# Patient Record
Sex: MALE | Race: BLACK OR AFRICAN AMERICAN | NOT HISPANIC OR LATINO | Employment: FULL TIME | URBAN - METROPOLITAN AREA
[De-identification: names, ages, dates, MRNs, and addresses within clinical notes are randomized per-mention and may not be internally consistent; named-entity substitution may affect disease eponyms.]

---

## 2020-01-28 ENCOUNTER — HOSPITAL ENCOUNTER (EMERGENCY)
Facility: HOSPITAL | Age: 65
Discharge: HOME | End: 2020-01-28
Attending: EMERGENCY MEDICINE
Payer: COMMERCIAL

## 2020-01-28 VITALS
HEIGHT: 72 IN | SYSTOLIC BLOOD PRESSURE: 138 MMHG | TEMPERATURE: 99 F | RESPIRATION RATE: 17 BRPM | DIASTOLIC BLOOD PRESSURE: 80 MMHG | BODY MASS INDEX: 25.6 KG/M2 | WEIGHT: 189 LBS | OXYGEN SATURATION: 96 % | HEART RATE: 88 BPM

## 2020-01-28 DIAGNOSIS — N45.3 EPIDIDYMO-ORCHITIS, ACUTE: Primary | ICD-10-CM

## 2020-01-28 DIAGNOSIS — N50.811 PAIN IN RIGHT TESTICLE: ICD-10-CM

## 2020-01-28 LAB
ALBUMIN SERPL BCP-MCNC: 3.4 G/DL (ref 3.5–5.2)
ALP SERPL-CCNC: 82 U/L (ref 55–135)
ALT SERPL W/O P-5'-P-CCNC: 13 U/L (ref 10–44)
ANION GAP SERPL CALC-SCNC: 7 MMOL/L (ref 8–16)
AST SERPL-CCNC: 17 U/L (ref 10–40)
BACTERIA #/AREA URNS AUTO: ABNORMAL /HPF
BASOPHILS # BLD AUTO: 0.01 K/UL (ref 0–0.2)
BASOPHILS NFR BLD: 0.1 % (ref 0–1.9)
BILIRUB SERPL-MCNC: 0.6 MG/DL (ref 0.1–1)
BILIRUB UR QL STRIP: NEGATIVE
BUN SERPL-MCNC: 13 MG/DL (ref 8–23)
CALCIUM SERPL-MCNC: 9 MG/DL (ref 8.7–10.5)
CHLORIDE SERPL-SCNC: 104 MMOL/L (ref 95–110)
CLARITY UR REFRACT.AUTO: ABNORMAL
CO2 SERPL-SCNC: 24 MMOL/L (ref 23–29)
COLOR UR AUTO: YELLOW
CREAT SERPL-MCNC: 1.3 MG/DL (ref 0.5–1.4)
DIFFERENTIAL METHOD: ABNORMAL
EOSINOPHIL # BLD AUTO: 0 K/UL (ref 0–0.5)
EOSINOPHIL NFR BLD: 0.2 % (ref 0–8)
ERYTHROCYTE [DISTWIDTH] IN BLOOD BY AUTOMATED COUNT: 15.3 % (ref 11.5–14.5)
EST. GFR  (AFRICAN AMERICAN): >60 ML/MIN/1.73 M^2
EST. GFR  (NON AFRICAN AMERICAN): 58.1 ML/MIN/1.73 M^2
GLUCOSE SERPL-MCNC: 99 MG/DL (ref 70–110)
GLUCOSE UR QL STRIP: NEGATIVE
HCT VFR BLD AUTO: 44.2 % (ref 40–54)
HGB BLD-MCNC: 14.4 G/DL (ref 14–18)
HGB UR QL STRIP: ABNORMAL
IMM GRANULOCYTES # BLD AUTO: 0.03 K/UL (ref 0–0.04)
IMM GRANULOCYTES NFR BLD AUTO: 0.3 % (ref 0–0.5)
KETONES UR QL STRIP: NEGATIVE
LEUKOCYTE ESTERASE UR QL STRIP: ABNORMAL
LYMPHOCYTES # BLD AUTO: 0.8 K/UL (ref 1–4.8)
LYMPHOCYTES NFR BLD: 8.9 % (ref 18–48)
MCH RBC QN AUTO: 31.4 PG (ref 27–31)
MCHC RBC AUTO-ENTMCNC: 32.6 G/DL (ref 32–36)
MCV RBC AUTO: 97 FL (ref 82–98)
MICROSCOPIC COMMENT: ABNORMAL
MONOCYTES # BLD AUTO: 0.9 K/UL (ref 0.3–1)
MONOCYTES NFR BLD: 10.4 % (ref 4–15)
NEUTROPHILS # BLD AUTO: 7.2 K/UL (ref 1.8–7.7)
NEUTROPHILS NFR BLD: 80.1 % (ref 38–73)
NITRITE UR QL STRIP: POSITIVE
NRBC BLD-RTO: 0 /100 WBC
PH UR STRIP: 5 [PH] (ref 5–8)
PLATELET # BLD AUTO: 159 K/UL (ref 150–350)
PMV BLD AUTO: 10 FL (ref 9.2–12.9)
POTASSIUM SERPL-SCNC: 3.8 MMOL/L (ref 3.5–5.1)
PROT SERPL-MCNC: 7.3 G/DL (ref 6–8.4)
PROT UR QL STRIP: NEGATIVE
RBC # BLD AUTO: 4.58 M/UL (ref 4.6–6.2)
RBC #/AREA URNS AUTO: 8 /HPF (ref 0–4)
SODIUM SERPL-SCNC: 135 MMOL/L (ref 136–145)
SP GR UR STRIP: 1.02 (ref 1–1.03)
SQUAMOUS #/AREA URNS AUTO: 0 /HPF
URN SPEC COLLECT METH UR: ABNORMAL
WBC # BLD AUTO: 8.95 K/UL (ref 3.9–12.7)
WBC #/AREA URNS AUTO: >100 /HPF (ref 0–5)

## 2020-01-28 PROCEDURE — 81001 URINALYSIS AUTO W/SCOPE: CPT

## 2020-01-28 PROCEDURE — 87077 CULTURE AEROBIC IDENTIFY: CPT

## 2020-01-28 PROCEDURE — 99285 EMERGENCY DEPT VISIT HI MDM: CPT | Mod: ,,, | Performed by: PHYSICIAN ASSISTANT

## 2020-01-28 PROCEDURE — 99284 EMERGENCY DEPT VISIT MOD MDM: CPT | Mod: 25

## 2020-01-28 PROCEDURE — 85025 COMPLETE CBC W/AUTO DIFF WBC: CPT

## 2020-01-28 PROCEDURE — 99285 PR EMERGENCY DEPT VISIT,LEVEL V: ICD-10-PCS | Mod: ,,, | Performed by: PHYSICIAN ASSISTANT

## 2020-01-28 PROCEDURE — 87186 SC STD MICRODIL/AGAR DIL: CPT

## 2020-01-28 PROCEDURE — 80053 COMPREHEN METABOLIC PANEL: CPT

## 2020-01-28 PROCEDURE — 87088 URINE BACTERIA CULTURE: CPT

## 2020-01-28 PROCEDURE — 96361 HYDRATE IV INFUSION ADD-ON: CPT

## 2020-01-28 PROCEDURE — 63600175 PHARM REV CODE 636 W HCPCS: Performed by: PHYSICIAN ASSISTANT

## 2020-01-28 PROCEDURE — 25000003 PHARM REV CODE 250: Performed by: PHYSICIAN ASSISTANT

## 2020-01-28 PROCEDURE — 96374 THER/PROPH/DIAG INJ IV PUSH: CPT

## 2020-01-28 PROCEDURE — 87086 URINE CULTURE/COLONY COUNT: CPT

## 2020-01-28 RX ORDER — LEVOFLOXACIN 500 MG/1
500 TABLET, FILM COATED ORAL DAILY
Qty: 9 TABLET | Refills: 0 | Status: SHIPPED | OUTPATIENT
Start: 2020-01-28 | End: 2020-02-07

## 2020-01-28 RX ORDER — LEVOFLOXACIN 500 MG/1
500 TABLET, FILM COATED ORAL
Status: COMPLETED | OUTPATIENT
Start: 2020-01-28 | End: 2020-01-28

## 2020-01-28 RX ORDER — CEFTRIAXONE 1 G/1
1 INJECTION, POWDER, FOR SOLUTION INTRAMUSCULAR; INTRAVENOUS
Status: COMPLETED | OUTPATIENT
Start: 2020-01-28 | End: 2020-01-28

## 2020-01-28 RX ORDER — ACETAMINOPHEN 500 MG
500 TABLET ORAL
Status: DISCONTINUED | OUTPATIENT
Start: 2020-01-28 | End: 2020-01-28 | Stop reason: HOSPADM

## 2020-01-28 RX ORDER — TAMSULOSIN HYDROCHLORIDE 0.4 MG/1
0.4 CAPSULE ORAL DAILY
COMMUNITY

## 2020-01-28 RX ORDER — MORPHINE SULFATE 15 MG/1
15 TABLET ORAL
Status: COMPLETED | OUTPATIENT
Start: 2020-01-28 | End: 2020-01-28

## 2020-01-28 RX ORDER — LISINOPRIL 5 MG/1
5 TABLET ORAL DAILY
COMMUNITY

## 2020-01-28 RX ADMIN — MORPHINE SULFATE 15 MG: 15 TABLET ORAL at 04:01

## 2020-01-28 RX ADMIN — SODIUM CHLORIDE 1000 ML: 0.9 INJECTION, SOLUTION INTRAVENOUS at 04:01

## 2020-01-28 RX ADMIN — CEFTRIAXONE SODIUM 1 G: 1 INJECTION, POWDER, FOR SOLUTION INTRAMUSCULAR; INTRAVENOUS at 06:01

## 2020-01-28 RX ADMIN — LEVOFLOXACIN 500 MG: 500 TABLET, FILM COATED ORAL at 08:01

## 2020-01-28 NOTE — ED NOTES
Appearance:  Pt awake, alert & oriented to person, place & time.    Skin:  Skin warm, dry & intact.  Mucous membranes moist.  Skin turgor normal.  Respiratory:  Respirations even, non-labored.    Neurologic:  Pt moving all extremities without difficulty.  Sensation intact.     Peripheral Vascular:  All peripheral pulses present.  Abdomen:  Abdomen soft, non-tender to palpation.

## 2020-01-28 NOTE — ED PROVIDER NOTES
Encounter Date: 2020       History     Chief Complaint   Patient presents with    Male  Problem     pulled r groin/ testicle on sat with swelling, today chills     64 year old male with medical history of HTN, HLD, BPH, Tobacco use presenting to the ED with the chief complaint of right testicle pain. Patient reports having 3 days of right testicular pain and swelling. He reports worsening pain while climbing into his SUV and believes he may have pulled a muscle. He reports having subjective fever and chills today. He denies dysuria, hematuria, penile discharge, urinary frequency, perineum pain, rectal pain, abdominal pain. He denies concerns for STD. He denies history of hernias and abdominal surgical history. He had a normal bowel movement today. He tried to see his PCP today, but he was unavailable which prompted his ED visit today. He took Tylenol #3 today at 11am and 3:30pm for his pain. He reports history of prostate infection and urinary retention after a prostate biopsy a few months ago.         Review of patient's allergies indicates:  No Known Allergies  Past Medical History:   Diagnosis Date    Enlarged prostate     Hyperlipidemia     Hypertension     Prehypertension     Smoker      History reviewed. No pertinent surgical history.  History reviewed. No pertinent family history.  Social History     Tobacco Use    Smoking status: Current Every Day Smoker     Packs/day: 0.50     Years: 45.00     Pack years: 22.50     Types: Cigarettes     Last attempt to quit: 3/27/2015     Years since quittin.8    Smokeless tobacco: Never Used   Substance Use Topics    Alcohol use: Yes     Comment: social    Drug use: No     Review of Systems   Constitutional: Positive for chills and fever (subjective).   HENT: Negative for congestion, sore throat and trouble swallowing.    Eyes: Negative for pain, redness and visual disturbance.   Respiratory: Negative for cough and shortness of breath.     Cardiovascular: Negative for chest pain.   Gastrointestinal: Negative for abdominal pain, diarrhea, nausea, rectal pain and vomiting.   Genitourinary: Positive for scrotal swelling and testicular pain. Negative for dysuria and hematuria.   Musculoskeletal: Negative for arthralgias, back pain, neck pain and neck stiffness.   Skin: Negative for rash and wound.   Neurological: Negative for weakness, light-headedness and headaches.       Physical Exam     Initial Vitals [01/28/20 1548]   BP Pulse Resp Temp SpO2   (!) 158/98 91 18 (!) 100.5 °F (38.1 °C) 96 %      MAP       --         Physical Exam    Constitutional: He appears well-developed and well-nourished. He is not diaphoretic. No distress.   HENT:   Head: Normocephalic and atraumatic.   Mouth/Throat: Oropharynx is clear and moist. No oropharyngeal exudate.   Eyes: EOM are normal. Pupils are equal, round, and reactive to light.   Neck: Normal range of motion. Neck supple.   Cardiovascular: Normal rate and regular rhythm.   Pulmonary/Chest: Breath sounds normal. No respiratory distress. He has no wheezes.   Abdominal: There is tenderness (Suprapubic).   No CVA tenderness   Genitourinary: Uncircumcised. No discharge found.   Genitourinary Comments: R testicular edema and tenderness   Musculoskeletal: Normal range of motion. He exhibits no edema or tenderness.   Neurological: He is alert and oriented to person, place, and time. He has normal strength. No cranial nerve deficit or sensory deficit.   Skin: Skin is warm and dry. No rash noted. No erythema.       ED Course   Procedures  Labs Reviewed   CULTURE, URINE - Abnormal; Notable for the following components:       Result Value    Urine Culture, Routine   (*)     Value: PRESUMPTIVE E COLI  >100,000 cfu/ml  Identification and susceptibility pending      All other components within normal limits    Narrative:     Preferred Collection Type->Urine, Clean Catch   CBC W/ AUTO DIFFERENTIAL - Abnormal; Notable for the  following components:    RBC 4.58 (*)     Mean Corpuscular Hemoglobin 31.4 (*)     RDW 15.3 (*)     Lymph # 0.8 (*)     Gran% 80.1 (*)     Lymph% 8.9 (*)     All other components within normal limits   COMPREHENSIVE METABOLIC PANEL - Abnormal; Notable for the following components:    Sodium 135 (*)     Albumin 3.4 (*)     Anion Gap 7 (*)     eGFR if non  58.1 (*)     All other components within normal limits   URINALYSIS, REFLEX TO URINE CULTURE - Abnormal; Notable for the following components:    Appearance, UA Hazy (*)     Occult Blood UA 1+ (*)     Nitrite, UA Positive (*)     Leukocytes, UA 3+ (*)     All other components within normal limits    Narrative:     Preferred Collection Type->Urine, Clean Catch   URINALYSIS MICROSCOPIC - Abnormal; Notable for the following components:    RBC, UA 8 (*)     WBC, UA >100 (*)     Bacteria Moderate (*)     All other components within normal limits    Narrative:     Preferred Collection Type->Urine, Clean Catch          Imaging Results           US Scrotum And Testicles (Final result)  Result time 01/28/20 19:06:56    Final result by Albert Peres MD (01/28/20 19:06:56)                 Impression:      Findings compatible with right epididymo-orchitis.  There are 2 hypoechoic structures within the epididymal tail, the largest measuring up to 0.6 and 0.3 cm, concerning for abscesses.    Small to moderate volume right hydrocele, likely reactive.    Additional incidental findings, as above.    This report was flagged in Epic as abnormal.    Electronically signed by resident: Emma Morgan  Date:    01/28/2020  Time:    18:51    Electronically signed by: Albert Peres MD  Date:    01/28/2020  Time:    19:06             Narrative:    EXAMINATION:  US SCROTUM AND TESTICLES    CLINICAL HISTORY:  Right testicular pain    TECHNIQUE:  Sonography of the scrotum and testes.    COMPARISON:  None.    FINDINGS:  Right Testicle:    *Size: 3.6 x 2.8 x 2.9  cm  *Appearance: Unremarkable.  *Flow: Increased flow within the right testicle compared to the left.  *Epididymis: Thickened with increased vascular flow, consistent with epididymitis.  There are 2 adjacent hypoechoic foci within the epididymal tail, measuring 0.6 x 0.5 x 0.6 cm and 0.3 x 0.2 x 0.3 cm, without evidence of internal vascular flow, concerning for abscess formation.  Two adjacent epididymal head cysts measuring up to 0.3 cm.  *Hydrocele: Small to moderate volume.  *Varicocele: None.  .    Left Testicle:    *Size: 3.9 x 2.1 x 2.3 cm  *Appearance: Unremarkable.  *Flow: Normal arterial and venous flow  *Epididymis: Tiny epididymal head cyst measuring up to 0.4 cm.  Otherwise, unremarkable.  *Hydrocele: None.  *Varicocele: None.  .    Other findings: None.                                 Medical Decision Making:   History:   Old Medical Records: I decided to obtain old medical records.  Old Records Summarized: records from clinic visits.  Clinical Tests:   Lab Tests: Ordered and Reviewed  Radiological Study: Ordered and Reviewed  Other:   I have discussed this case with another health care provider.       <> Summary of the Discussion: Discussed case with Urology       APC / Resident Notes:   64 year old male with medical history of HTN, HLD, BPH, Tobacco use presenting to the ED c/o 3 days of right testicular pain and swelling. Triage vitals significant for T100.5. DDx includes but not limited to epididymitis, orchiditis, testicular torsion, prostatitis, UTI, hernia, urethritis. Will obtain labs and U/S scrotum. Reports taking a Tylenol #3 prior to arrival.      WBC 8.9, H/H 14/44, Plt 159, Crt 1.3  UA shows +nitrite, 3 leuk, >100 WBC  U/S shows right epididymo-orchitis with 2 hypoechoic structures measuring up to 0.6 and 0.3 cm, concerning for abscesses.    Etiology consistent with epididymo-orchitis. Discussed U/S findings and concerns for abscesses with Urology. No induration or overlying skin changes  to the scrotum and do not feel I&D would be beneficial. Recommending outpatient follow-up with ABx therapy. Rocephin IV x1 given in the ED and RX for Levaquin provided. He has an appointment with his Urologist at [facility] tomorrow for further evaluation. Patient expresses understanding and agreeable to the plan. Return to ED precautions given for new, worsening, or concerning symptoms. I have discussed the care of this patient with my supervising physician.         Attending Attestation:     Physician Attestation Statement for NP/PA:   I discussed this assessment and plan of this patient with the NP/PA, but I did not personally examine the patient. The face to face encounter was performed by the NP/PA.                                Clinical Impression:       ICD-10-CM ICD-9-CM   1. Epididymo-orchitis, acute N45.3 604.99   2. Pain in right testicle N50.811 608.9         Disposition:   Disposition: Discharged  Condition: Stable                     Albert Garcia PA-C  01/28/20 5017       Carlos Luis III, MD  01/30/20 1218

## 2020-01-28 NOTE — ED TRIAGE NOTES
Pt c/o right testicular swelling since Saturday.  States now having groin pain.  Pt states he began having chills earlier today-pt took Tylenol 1530.  Pt denies urinary symptoms.

## 2020-01-29 NOTE — DISCHARGE INSTRUCTIONS
Take the prescribed Levaquin antibiotic as directed for further management of your testicular pain  Follow-up with your Urologist for further evaluation    Our goal in the emergency department is to always give you outstanding care and exceptional service. You may receive a survey by mail or e-mail in the next week regarding your experience in our ED. We would greatly appreciate your completing and returning the survey. Your feedback provides us with a way to recognize our staff who give very good care and it helps us learn how to improve when your experience was below our aspiration of excellence.

## 2020-01-30 LAB — BACTERIA UR CULT: ABNORMAL

## 2020-05-31 ENCOUNTER — HOSPITAL ENCOUNTER (EMERGENCY)
Facility: HOSPITAL | Age: 65
Discharge: HOME | End: 2020-05-31
Attending: EMERGENCY MEDICINE
Payer: COMMERCIAL

## 2020-05-31 VITALS
HEART RATE: 84 BPM | WEIGHT: 192 LBS | HEIGHT: 72 IN | SYSTOLIC BLOOD PRESSURE: 132 MMHG | RESPIRATION RATE: 18 BRPM | DIASTOLIC BLOOD PRESSURE: 77 MMHG | BODY MASS INDEX: 26.01 KG/M2 | TEMPERATURE: 99 F | OXYGEN SATURATION: 99 %

## 2020-05-31 DIAGNOSIS — H11.31 SUBCONJUNCTIVAL HEMORRHAGE OF RIGHT EYE: Primary | ICD-10-CM

## 2020-05-31 PROCEDURE — 99284 EMERGENCY DEPT VISIT MOD MDM: CPT | Mod: ,,, | Performed by: EMERGENCY MEDICINE

## 2020-05-31 PROCEDURE — 99284 EMERGENCY DEPT VISIT MOD MDM: CPT | Mod: 25

## 2020-05-31 PROCEDURE — 25000003 PHARM REV CODE 250: Performed by: EMERGENCY MEDICINE

## 2020-05-31 PROCEDURE — 99284 PR EMERGENCY DEPT VISIT,LEVEL IV: ICD-10-PCS | Mod: ,,, | Performed by: EMERGENCY MEDICINE

## 2020-05-31 RX ORDER — PROPARACAINE HYDROCHLORIDE 5 MG/ML
1 SOLUTION/ DROPS OPHTHALMIC
Status: COMPLETED | OUTPATIENT
Start: 2020-05-31 | End: 2020-05-31

## 2020-05-31 RX ORDER — ERYTHROMYCIN 5 MG/G
OINTMENT OPHTHALMIC
Qty: 1 TUBE | Refills: 0 | Status: SHIPPED | OUTPATIENT
Start: 2020-05-31

## 2020-05-31 RX ADMIN — PROPARACAINE HYDROCHLORIDE 1 DROP: 5 SOLUTION/ DROPS OPHTHALMIC at 01:05

## 2020-05-31 RX ADMIN — FLUORESCEIN SODIUM 1 EACH: 1 STRIP OPHTHALMIC at 01:05

## 2020-05-31 NOTE — ED PROVIDER NOTES
CC: Eye Problem (cutting grass and took goggles off, something hit him in right eye )      History provided by:   Patient     HPI: Orlando Pham is a 64 y.o. year old male who presents to the ED complaining of right eye swelling after he thinks a piece of wood flew into his right eye.  He was blowing leaves, his goggles started to fog and removed them and afterwards he felt like something got into his right eye believes a small branch possibly flew in his right eye and since then he has been having swelling of the right eye, minimal pain, no decreased vision            Past Medical History:   Diagnosis Date    Enlarged prostate     Hyperlipidemia     Hypertension     Prehypertension     Smoker      History reviewed. No pertinent surgical history.  History reviewed. No pertinent family history.  No current facility-administered medications on file prior to encounter.      Current Outpatient Medications on File Prior to Encounter   Medication Sig Dispense Refill    lisinopril (PRINIVIL,ZESTRIL) 5 MG tablet Take 5 mg by mouth once daily.      tamsulosin (FLOMAX) 0.4 mg Cap Take 0.4 mg by mouth once daily.      albuterol 90 mcg/actuation inhaler Inhale 2 puffs into the lungs every 4 (four) hours as needed for Wheezing or Shortness of Breath. 1 each 11    nicotine polacrilex (NICORETTE) 4 MG Gum Take 1 each (4 mg total) by mouth as needed. 220 each 2    potassium chloride (KLOR-CON) 10 MEQ TbSR Take 2 tablets (20 mEq total) by mouth daily as needed (muscle cramps). 60 tablet 5    rosuvastatin (CRESTOR) 10 MG tablet Take 1 tablet (10 mg total) by mouth once daily. 30 tablet 11    varenicline (CHANTIX STARTING MONTH BOX) 0.5 mg (11)- 1 mg (42) tablet Take as directed on Starter Pack. 1 Package 0    varenicline (CHANTIX) 1 mg Tab Take 1 tablet (1 mg total) by mouth 2 (two) times daily. Take after finishing Starter Pack 56 tablet 2     Patient has no known allergies.  Social History     Socioeconomic  History    Marital status:      Spouse name: Not on file    Number of children: Not on file    Years of education: Not on file    Highest education level: Not on file   Occupational History    Not on file   Social Needs    Financial resource strain: Not on file    Food insecurity:     Worry: Not on file     Inability: Not on file    Transportation needs:     Medical: Not on file     Non-medical: Not on file   Tobacco Use    Smoking status: Current Every Day Smoker     Packs/day: 0.50     Years: 45.00     Pack years: 22.50     Types: Cigarettes     Last attempt to quit: 3/27/2015     Years since quittin.1    Smokeless tobacco: Never Used   Substance and Sexual Activity    Alcohol use: Yes     Comment: social    Drug use: No    Sexual activity: Yes     Partners: Female   Lifestyle    Physical activity:     Days per week: Not on file     Minutes per session: Not on file    Stress: Not on file   Relationships    Social connections:     Talks on phone: Not on file     Gets together: Not on file     Attends Congregational service: Not on file     Active member of club or organization: Not on file     Attends meetings of clubs or organizations: Not on file     Relationship status: Not on file   Other Topics Concern    Not on file   Social History Narrative    Work for [employer]       ROS:     Constitutional : neg for fever, neg for weakness  HENT neg for head injury, neg for sore throat  Eyes:right eye swelling, minimal pain  Resp neg for SOB, neg for cough  Cardiac  neg for chest pain, neg for palpitations  GI neg for abd pain, neg for nausea, neg for vomiting   neg for urinary changes  Neuro neg for focal weakness or numbness  Skin neg for skin rash  MSK: neg for myalgia, neg for arthralgia  ALL: Patient has no known allergies.    PHYSICAL EXAM:  Vitals:    20 1317   BP: 134/69   Pulse: 86   Resp: 18   Temp: 98.7 °F (37.1 °C)         PHYSICAL EXAM:     general: comfortable, in no acute  distress, pleasant, well nourished  VS: triage VS reviewed  HENT: NC/AT  Eyes: PERRL, EOMI, 20/40 right eye, 20/70 left eye  Ant chamber quiet  Neg ivette sign, no corneal abrasion  no clear conjunctival laceration  Neuro: AAO x 3, face symmetric, speech normal  MSK: no deformity, no edema                        DATA & INTERVENTIONS:    LABS reviewed:  Labs Reviewed - No data to display    RADIOLOGY reviewed:  Imaging Results    None         MEDICATIONS/FLUIDS:  Medications   proparacaine 0.5 % ophthalmic solution 1 drop (1 drop Both Eyes Given 5/31/20 1338)   fluorescein ophthalmic strip 1 each (1 each Both Eyes Given 5/31/20 1339)         MDM:  Orlando Pham is a 64 y.o. year old male who presents to the ED complaining of right eye injury he thinks a small piece of branch got into his right eye.  Minimal pain, visual acuity 20/40, anterior chamber quite  Negative Ivette sign.  Large irregular protuberant clot with concern for conjunctival or scleral laceration concerning for intra-ocular foreign body    Ophthalmology was consulted  CT orbits No radiopaque foreign body identified within the orbits or periorbital regions.  Additional evaluation including dedicated ophthalmology examination, as clinically warranted.  Paranasal sinus disease, most likely chronic.    Ophthalmology recs: no laceration. erythromycin ophthalmic, clear tears, they will contact the patient if he need outpatient follow up  The patient has been carefully educated about symptoms and conditions that should prompt immediate return to the ED for recheck or further evaluation. Told to return immediately for any new or worsening or progressive symptoms.     IMPRESSION:  1.) right eye subconjunctival hemorrhage      Dispo: Discharge    Critical Care Time: N/A       Columba Martin MD  05/31/20 6569

## 2020-05-31 NOTE — ED NOTES
Pt waiting comfortably for provider in Recliner #6. RR e/u. VS being monitored per MD orders. Pt offered bathroom assistance and denies need at this time. Explanation of care/wait provided. Call bell in reach. Will continue to monitor.

## 2020-05-31 NOTE — ED TRIAGE NOTES
Something struck pt in rt eye when he removed his goggles while weed eating. 1 hr PTA. Rt eye w/ bloody under sclera. Denies pain . States last tetanus was 5 yrs ago.  Denies vision change in rt eye post inuury

## 2020-05-31 NOTE — CONSULTS
Consultation Report  Ophthalmology Service    Date: 05/31/2020    Chief complaint/Reason for Consult: right eye subconj bleeding, findings out of proportion with mechanism of trauma     History of Present Illness: Orlando Pham is a 64 y.o. male who presents with right eye trauma that resulted in right eye swelling. Per patient, he was hedging bushes with a motorized  (not blowing leaves as noted by ED), during which a branch hit his right eye. He immediately felt swelling and saw subconj bleeding prompting him to present to the ED.    Patient denies any visual changes or disturbances, flashes or floaters OU. Patient reports mild ocular discomfort OD. No discomfort OS.    POcularHx: No history of ocular problems or past ocular surgeries.    Current eye gtts: none      PMHx:  has a past medical history of Enlarged prostate, Hyperlipidemia, Hypertension, Prehypertension, and Smoker.     PSurgHx:  has no past surgical history on file.     Home Medications:   Prior to Admission medications    Medication Sig Start Date End Date Taking? Authorizing Provider   lisinopril (PRINIVIL,ZESTRIL) 5 MG tablet Take 5 mg by mouth once daily.   Yes Historical Provider, MD   tamsulosin (FLOMAX) 0.4 mg Cap Take 0.4 mg by mouth once daily.   Yes Historical Provider, MD   albuterol 90 mcg/actuation inhaler Inhale 2 puffs into the lungs every 4 (four) hours as needed for Wheezing or Shortness of Breath. 2/13/15   Jim Yepez MD   erythromycin (ROMYCIN) ophthalmic ointment Place a 1/2 inch ribbon of ointment into the lower eyelid 4 times a day 5/31/20   Columba Martin MD   nicotine polacrilex (NICORETTE) 4 MG Gum Take 1 each (4 mg total) by mouth as needed. 3/6/15   Yoli Christensen MD   polyvinyl alcohol-povidone 0.5-0.6 % Drop Apply 1 drop to eye 4 (four) times daily. 5/31/20   Columba Martin MD   potassium chloride (KLOR-CON) 10 MEQ TbSR Take 2 tablets (20 mEq total) by mouth daily as needed (muscle  cramps). 2/13/15   Jim Yepez MD   rosuvastatin (CRESTOR) 10 MG tablet Take 1 tablet (10 mg total) by mouth once daily. 2/13/15 2/13/16  Jim Yepez MD   varenicline (CHANTIX STARTING MONTH BOX) 0.5 mg (11)- 1 mg (42) tablet Take as directed on Starter Pack. 3/6/15   Yoli Christensen MD   varenicline (CHANTIX) 1 mg Tab Take 1 tablet (1 mg total) by mouth 2 (two) times daily. Take after finishing Starter Pack 3/6/15   Yoli Christensen MD        Medications this encounter:     Allergies: has No Known Allergies.     Social:  reports that he has been smoking cigarettes. He has a 22.50 pack-year smoking history. He has never used smokeless tobacco. He reports that he drinks alcohol. He reports that he does not use drugs.     Family Hx: No family history of glaucoma, macular degeneration, or blindness. family history is not on file.     ROS: As per HPI    Ocular examination/Dilated fundus examination:  Base Eye Exam     Visual Acuity       Right Left    Near cc 20/40; PHNI 20/40; PHNI          Tonometry (Tonopen, 4:44 PM)       Right Left    Pressure 13 14          Pupils       Pupils    Right PERRL    Left PERRL          Visual Fields       Right Left     Full Full          Extraocular Movement       Right Left     Full, Ortho Full, Ortho            Slit Lamp and Fundus Exam     External Exam       Right Left    External Normal Normal          Slit Lamp Exam       Right Left    Lids/Lashes Normal Normal    Conjunctiva/Sclera hemorrhagic chemosis 3 o'clock to 11 o'clock, no entry wound or laceration noted on exploration, without retained foreign body White and quiet    Cornea 9 o'clock 2 mm round epidefect, otherwise clear without retained foreign body Clear    Anterior Chamber Deep and quiet Deep and quiet    Iris Round and reactive Round and reactive    Lens 1+ Nuclear sclerosis 1+ Nuclear sclerosis    Vitreous Vitreous syneresis Vitreous syneresis          Fundus Exam       Right Left    Disc Normal Normal    C/D  Ratio 0.3 0.3    Macula Normal Normal    Vessels Normal Normal    Periphery Normal Normal                Right Eye at presentation      CT Orbits Sella Post Fossa Without Cont  Impression    No radiopaque foreign body identified within the orbits or periorbital regions.  Additional evaluation including dedicated ophthalmology examination, as clinically warranted.  Paranasal sinus disease, most likely chronic.      Assessment/Plan:     1. Hemorrhagic Chemosis OD, secondary to trauma  No concern for open globe, unable to find entry wound on conj exploration at slit lamp and CT orbits wnl  Findings consistent with clarified history -- patient was hit in the eye by a branch rather than leaf debris falling in his eye  No retained foreign body in cornea or conjunctiva on exploration  Start erythromycin ointment TID -- 0.25 inches to inside of lower eyelid, blink to spread, will blur vision  Start Artificial tears QID  Return general precautions, specified retinal detachment and infection symptoms  Return to clinic 1 week vs PRN per Dr. Kenya Henderson phone: [phone] -- clinic staff to call patient for followup instructions      Eliud Yepez MD PGY-2  Ophthalmology Resident  05/31/2020  4:32 PM     Render Note In Bullet Format When Appropriate: No Detail Level: Simple Render Post-Care Instructions In Note?: yes Post-Care Instructions: I reviewed with the patient in detail post-care instructions. Patient is to wear sunprotection, and avoid picking at any of the treated lesions. Patient may apply Vaseline to crusted or scabbing areas. Patient understands to RTO 1 month if still present or recurs. Duration Of Freeze Thaw-Cycle (Seconds): 0 Consent: The patient's consent was obtained including but not limited to risks of crusting, scabbing, blistering, scarring, darker or lighter pigmentary change, recurrence, incomplete removal and infection.

## 2020-06-01 ENCOUNTER — TELEPHONE (OUTPATIENT)
Dept: OPHTHALMOLOGY | Facility: CLINIC | Age: 65
End: 2020-06-01